# Patient Record
Sex: FEMALE | Race: WHITE | Employment: STUDENT | URBAN - METROPOLITAN AREA
[De-identification: names, ages, dates, MRNs, and addresses within clinical notes are randomized per-mention and may not be internally consistent; named-entity substitution may affect disease eponyms.]

---

## 2020-03-03 ENCOUNTER — APPOINTMENT (OUTPATIENT)
Dept: GENERAL RADIOLOGY | Age: 20
End: 2020-03-03
Attending: EMERGENCY MEDICINE
Payer: MEDICAID

## 2020-03-03 ENCOUNTER — HOSPITAL ENCOUNTER (EMERGENCY)
Age: 20
Discharge: HOME | End: 2020-03-03
Attending: EMERGENCY MEDICINE
Payer: MEDICAID

## 2020-03-03 VITALS
HEART RATE: 115 BPM | BODY MASS INDEX: 33.46 KG/M2 | HEIGHT: 72 IN | OXYGEN SATURATION: 98 % | DIASTOLIC BLOOD PRESSURE: 83 MMHG | RESPIRATION RATE: 20 BRPM | SYSTOLIC BLOOD PRESSURE: 137 MMHG | TEMPERATURE: 99.6 F | WEIGHT: 247 LBS

## 2020-03-03 DIAGNOSIS — J10.1 INFLUENZA A: Primary | ICD-10-CM

## 2020-03-03 LAB
FLUAV AG NPH QL IA: POSITIVE
FLUBV AG NOSE QL IA: NEGATIVE

## 2020-03-03 PROCEDURE — 99283 EMERGENCY DEPT VISIT LOW MDM: CPT

## 2020-03-03 PROCEDURE — 87804 INFLUENZA ASSAY W/OPTIC: CPT

## 2020-03-03 PROCEDURE — 94640 AIRWAY INHALATION TREATMENT: CPT

## 2020-03-03 PROCEDURE — 74011000250 HC RX REV CODE- 250: Performed by: EMERGENCY MEDICINE

## 2020-03-03 PROCEDURE — 77030013140 HC MSK NEB VYRM -A

## 2020-03-03 PROCEDURE — 71046 X-RAY EXAM CHEST 2 VIEWS: CPT

## 2020-03-03 PROCEDURE — 74011250637 HC RX REV CODE- 250/637: Performed by: EMERGENCY MEDICINE

## 2020-03-03 RX ORDER — ALBUTEROL SULFATE 90 UG/1
2 AEROSOL, METERED RESPIRATORY (INHALATION)
Qty: 1 INHALER | Refills: 0 | Status: SHIPPED | OUTPATIENT
Start: 2020-03-03

## 2020-03-03 RX ORDER — ACETAMINOPHEN 500 MG
1000 TABLET ORAL
COMMUNITY

## 2020-03-03 RX ORDER — IBUPROFEN 600 MG/1
600 TABLET ORAL
Status: COMPLETED | OUTPATIENT
Start: 2020-03-03 | End: 2020-03-03

## 2020-03-03 RX ADMIN — IBUPROFEN 600 MG: 600 TABLET, FILM COATED ORAL at 21:17

## 2020-03-03 RX ADMIN — ALBUTEROL SULFATE 1 DOSE: 2.5 SOLUTION RESPIRATORY (INHALATION) at 21:17

## 2020-03-04 NOTE — DISCHARGE INSTRUCTIONS
We hope that we have addressed all of your medical concerns. The examination and treatment you received in the Emergency Department were for an emergent problem and were not intended as complete care. It is important that you follow up with your healthcare provider(s) for ongoing care. If your symptoms worsen or do not improve as expected, and you are unable to reach your usual health care provider(s), you should return to the Emergency Department. Today's healthcare is undergoing tremendous change, and patient satisfaction surveys are one of the many tools to assess the quality of medical care. You may receive a survey from the [employer] organization regarding your experience in the Emergency Department. I hope that your experience has been completely positive, particularly the medical care that I provided. As such, please participate in the survey; anything less than excellent does not meet my expectations or intentions. [facility] and [facility] participate in nationally recognized quality of care measures. If your blood pressure is greater than 120/80, as reported below, we urge that you seek medical care to address the potential of high blood pressure, commonly known as hypertension. Hypertension can be hereditary or can be caused by certain medical conditions, pain, stress, or \"white coat syndrome. \"       Please make an appointment with your health care provider(s) for follow up of your Emergency Department visit. VITALS:   Patient Vitals for the past 8 hrs:   Temp Pulse Resp BP SpO2   03/03/20 2102 99.6 °F (37.6 °C) (!) 115 20 137/83 98 %          Thank you for allowing us to provide you with medical care today. We realize that you have many choices for your emergency care needs. Please choose us in the future for any continued health care needs. Fuad Campos, [address] Office: [phone]            Recent Results (from the past 24 hour(s))   INFLUENZA A+B VIRAL AGS    Collection Time: 03/03/20  9:14 PM   Result Value Ref Range    Influenza A Antigen POSITIVE (A) NEG      Influenza B Antigen NEGATIVE  NEG         Xr Chest Pa Lat    Result Date: 3/3/2020  INDICATION:   pneumonia COMPARISON: None FINDINGS: Frontal and lateral views of the chest demonstrate a normal cardiomediastinal silhouette. The lungs are adequately expanded. There is no edema, effusion, consolidation, or pneumothorax. The osseous structures are unremarkable. IMPRESSION: No acute process. Patient Education        Influenza (Flu): Care Instructions  Your Care Instructions    Influenza (flu) is an infection in the lungs and breathing passages. It is caused by the influenza virus. There are different strains, or types, of the flu virus from year to year. Unlike the common cold, the flu comes on suddenly and the symptoms, such as a cough, congestion, fever, chills, fatigue, aches, and pains, are more severe. These symptoms may last up to 10 days. Although the flu can make you feel very sick, it usually doesn't cause serious health problems. Home treatment is usually all you need for flu symptoms. But your doctor may prescribe antiviral medicine to prevent other health problems, such as pneumonia, from developing. Older people and those who have a long-term health condition, such as lung disease, are most at risk for having pneumonia or other health problems. Follow-up care is a key part of your treatment and safety. Be sure to make and go to all appointments, and call your doctor if you are having problems. It's also a good idea to know your test results and keep a list of the medicines you take. How can you care for yourself at home? · Get plenty of rest.  · Drink plenty of fluids, enough so that your urine is light yellow or clear like water.  If you have kidney, heart, or liver disease and have to limit fluids, talk with your doctor before you increase the amount of fluids you drink. · Take an over-the-counter pain medicine if needed, such as acetaminophen (Tylenol), ibuprofen (Advil, Motrin), or naproxen (Aleve), to relieve fever, headache, and muscle aches. Read and follow all instructions on the label. No one younger than 20 should take aspirin. It has been linked to Reye syndrome, a serious illness. · Do not smoke. Smoking can make the flu worse. If you need help quitting, talk to your doctor about stop-smoking programs and medicines. These can increase your chances of quitting for good. · Breathe moist air from a hot shower or from a sink filled with hot water to help clear a stuffy nose. · Before you use cough and cold medicines, check the label. These medicines may not be safe for young children or for people with certain health problems. · If the skin around your nose and lips becomes sore, put some petroleum jelly on the area. · To ease coughing:  ? Drink fluids to soothe a scratchy throat. ? Suck on cough drops or plain hard candy. ? Take an over-the-counter cough medicine that contains dextromethorphan to help you get some sleep. Read and follow all instructions on the label. ? Raise your head at night with an extra pillow. This may help you rest if coughing keeps you awake. · Take any prescribed medicine exactly as directed. Call your doctor if you think you are having a problem with your medicine. To avoid spreading the flu  · Wash your hands regularly, and keep your hands away from your face. · Stay home from school, work, and other public places until you are feeling better and your fever has been gone for at least 24 hours. The fever needs to have gone away on its own without the help of medicine. · Ask people living with you to talk to their doctors about preventing the flu. They may get antiviral medicine to keep from getting the flu from you.   · To prevent the flu in the future, get a flu vaccine every fall. Encourage people living with you to get the vaccine. · Cover your mouth when you cough or sneeze. When should you call for help? Call 911 anytime you think you may need emergency care. For example, call if:    · You have severe trouble breathing.    Call your doctor now or seek immediate medical care if:    · You have new or worse trouble breathing.     · You seem to be getting much sicker.     · You feel very sleepy or confused.     · You have a new or higher fever.     · You get a new rash.    Watch closely for changes in your health, and be sure to contact your doctor if:    · You begin to get better and then get worse.     · You are not getting better after 1 week. Where can you learn more? Go to http://merle-andria.info/. Enter Q000 in the search box to learn more about \"Influenza (Flu): Care Instructions. \"  Current as of: June 9, 2019  Content Version: 12.2  © 0366-2377 [employer]. Care instructions adapted under license by [employer] (which disclaims liability or warranty for this information). If you have questions about a medical condition or this instruction, always ask your healthcare professional. [address] any warranty or liability for your use of this information.

## 2020-03-04 NOTE — ED TRIAGE NOTES
Pt presents to ED with c/o 2 day hx of cough productive of green sputum with nasal congestion and facial pain.

## 2020-03-04 NOTE — ED PROVIDER NOTES
This is a 71-year-old female comes emergency room with chief complaint of cough. Patient states that she has had a 2-day history of [place] productive cough of green sputum. Patient is also had nasal congestion and facial pain. Patient did take some over-the-counter medicine yesterday for a headache which seemed to improve it mildly. Patient has had a subjective fever. Patient denies any nausea or vomiting. Patient denies any abdominal pain. Patient has had other sick contacts. The history is provided by the patient. No  was used. Cough   This is a new problem. The current episode started 2 days ago. The problem occurs every few minutes. The problem has been gradually worsening. The cough is productive of sputum (Minimally). Patient reports a subjective fever - was not measured. The fever has been present for 1 - 2 days. Associated symptoms include headaches and myalgias. Pertinent negatives include no chest pain, no chills, no ear pain, no rhinorrhea, no shortness of breath, no vomiting and no confusion. Treatments tried: Over-the-counter meds. The treatment provided mild relief. She is not a smoker (Used to vape). Her past medical history does not include bronchitis, pneumonia or asthma. History reviewed. No pertinent past medical history. History reviewed. No pertinent surgical history. History reviewed. No pertinent family history.     Social History     Socioeconomic History    Marital status: SINGLE     Spouse name: Not on file    Number of children: Not on file    Years of education: Not on file    Highest education level: Not on file   Occupational History    Not on file   Social Needs    Financial resource strain: Not on file    Food insecurity:     Worry: Not on file     Inability: Not on file    Transportation needs:     Medical: Not on file     Non-medical: Not on file   Tobacco Use    Smoking status: Never Smoker    Smokeless tobacco: Former User Substance and Sexual Activity    Alcohol use: Not Currently    Drug use: Not on file    Sexual activity: Not on file   Lifestyle    Physical activity:     Days per week: Not on file     Minutes per session: Not on file    Stress: Not on file   Relationships    Social connections:     Talks on phone: Not on file     Gets together: Not on file     Attends Confucianism service: Not on file     Active member of club or organization: Not on file     Attends meetings of clubs or organizations: Not on file     Relationship status: Not on file    Intimate partner violence:     Fear of current or ex partner: Not on file     Emotionally abused: Not on file     Physically abused: Not on file     Forced sexual activity: Not on file   Other Topics Concern    Not on file   Social History Narrative    Not on file     ALLERGIES: Patient has no known allergies. Review of Systems   Constitutional: Positive for fever. Negative for appetite change, chills and unexpected weight change. HENT: Negative for ear pain, hearing loss, rhinorrhea and trouble swallowing. Eyes: Negative for pain and visual disturbance. Respiratory: Positive for cough. Negative for chest tightness and shortness of breath. Cardiovascular: Negative for chest pain and palpitations. Gastrointestinal: Negative for abdominal distention, abdominal pain, blood in stool and vomiting. Genitourinary: Negative for dysuria, hematuria and urgency. Musculoskeletal: Positive for myalgias. Negative for back pain. Skin: Negative for rash. Neurological: Positive for headaches. Negative for dizziness, syncope, weakness and numbness. Psychiatric/Behavioral: Negative for confusion and suicidal ideas. All other systems reviewed and are negative.       Vitals:    03/03/20 2102   BP: 137/83   Pulse: (!) 115   Resp: 20   Temp: 99.6 °F (37.6 °C)   SpO2: 98%   Weight: 112 kg (247 lb)   Height: 6' (1.829 m)            Physical Exam  Vitals signs and nursing note reviewed. Constitutional:       General: She is not in acute distress. Appearance: Normal appearance. She is well-developed. She is not ill-appearing, toxic-appearing or diaphoretic. HENT:      Head: Normocephalic and atraumatic. Right Ear: Tympanic membrane, ear canal and external ear normal. There is no impacted cerumen. Left Ear: Tympanic membrane, ear canal and external ear normal. There is no impacted cerumen. Nose: Nose normal.      Mouth/Throat:      Mouth: Mucous membranes are moist.      Pharynx: No oropharyngeal exudate or posterior oropharyngeal erythema. Eyes:      General: No scleral icterus. Right eye: No discharge. Left eye: No discharge. Extraocular Movements: Extraocular movements intact. Conjunctiva/sclera: Conjunctivae normal.      Pupils: Pupils are equal, round, and reactive to light. Neck:      Musculoskeletal: Normal range of motion and neck supple. Vascular: No JVD. Trachea: No tracheal deviation. Meningeal: Brudzinski's sign and Kernig's sign absent. Cardiovascular:      Rate and Rhythm: Regular rhythm. Tachycardia present. Heart sounds: Normal heart sounds. No murmur. No friction rub. No gallop. Comments: Slightly tachycardic  Pulmonary:      Effort: Pulmonary effort is normal. Prolonged expiration (Slightly prolonged) present. No respiratory distress. Breath sounds: No stridor. Examination of the right-lower field reveals decreased breath sounds. Decreased breath sounds present. No wheezing, rhonchi or rales. Chest:      Chest wall: No tenderness. Abdominal:      General: Bowel sounds are normal. There is no distension. Palpations: Abdomen is soft. Tenderness: There is no abdominal tenderness. There is no guarding or rebound. Musculoskeletal: Normal range of motion. General: No tenderness. Skin:     General: Skin is warm and dry.       Capillary Refill: Capillary refill takes less than 2 seconds. Coloration: Skin is not pale. Findings: No erythema or rash. Neurological:      General: No focal deficit present. Mental Status: She is alert and oriented to person, place, and time. GCS: GCS eye subscore is 4. GCS verbal subscore is 5. GCS motor subscore is 6. Cranial Nerves: No cranial nerve deficit. Sensory: No sensory deficit. Motor: No weakness or abnormal muscle tone. Coordination: Coordination normal.      Deep Tendon Reflexes: Reflexes are normal and symmetric. Reflexes normal.   Psychiatric:         Attention and Perception: Attention and perception normal.         Mood and Affect: Mood and affect normal.         Speech: Speech normal.         Behavior: Behavior normal. Behavior is cooperative. Thought Content: Thought content normal.         Cognition and Memory: Cognition normal.         Judgment: Judgment normal.        MDM  Number of Diagnoses or Management Options  Influenza A:      Amount and/or Complexity of Data Reviewed  Clinical lab tests: ordered and reviewed  Tests in the radiology section of CPT®: ordered and reviewed    Risk of Complications, Morbidity, and/or Mortality  Presenting problems: moderate  Diagnostic procedures: low  Management options: moderate    Patient Progress  Patient progress: stable       Procedures    Chief Complaint   Patient presents with    Cough       The patient's presenting problems have been discussed, and they are in agreement with the care plan formulated and outlined with them. I have encouraged them to ask questions as they arise throughout their visit.     MEDICATIONS GIVEN:  Medications   ibuprofen (MOTRIN) tablet 600 mg (600 mg Oral Given 3/3/20 2117)   albuterol 5mg / ipratropium 0.5mg neb solution (1 Dose Nebulization Given 3/3/20 2117)       LABS REVIEWED:  Recent Results (from the past 24 hour(s))   INFLUENZA A+B VIRAL AGS    Collection Time: 03/03/20  9:14 PM   Result Value Ref Range Influenza A Antigen POSITIVE (A) NEG      Influenza B Antigen NEGATIVE  NEG         VITAL SIGNS:  Patient Vitals for the past 24 hrs:   Temp Pulse Resp BP SpO2   03/03/20 2102 99.6 °F (37.6 °C) (!) 115 20 137/83 98 %       RADIOLOGY RESULTS:  The following have been ordered and reviewed:  Xr Chest Pa Lat    Result Date: 3/3/2020  INDICATION:   pneumonia COMPARISON: None FINDINGS: Frontal and lateral views of the chest demonstrate a normal cardiomediastinal silhouette. The lungs are adequately expanded. There is no edema, effusion, consolidation, or pneumothorax. The osseous structures are unremarkable. IMPRESSION: No acute process. PROGRESS NOTES:  Patient feeling better after nebulizer treatment. Patient diagnosed with flu a with an normal chest x-ray. Discussed risk benefits of Tamiflu usage. Patient decided to not take prescription for Tamiflu. Discussed results and plan with patient. Patient will be discharged home with PCP follow up. Patient instructed to return to the emergency room for any worsening symptoms or any other concerns. DIAGNOSIS:    1. Influenza A        PLAN:  Follow-up Information     Follow up With Specialties Details Why Contact Info    your doctor  In 1 week As needed     [address] [place] [address] DEPT Emergency Medicine  If symptoms worsen [address] [place]  [phone]        Discharge Medication List as of 3/3/2020  9:45 PM      START taking these medications    Details   albuterol (PROVENTIL HFA, VENTOLIN HFA, PROAIR HFA) 90 mcg/actuation inhaler Take 2 Puffs by inhalation every four (4) hours as needed for Wheezing or Shortness of Breath., Print, Disp-1 Inhaler, R-0         CONTINUE these medications which have NOT CHANGED    Details   acetaminophen (TYLENOL) 500 mg tablet Take 1,000 mg by mouth every six (6) hours as needed for Pain., Historical Med             ED COURSE: The patient's hospital course has been uncomplicated.